# Patient Record
Sex: MALE | Race: WHITE
[De-identification: names, ages, dates, MRNs, and addresses within clinical notes are randomized per-mention and may not be internally consistent; named-entity substitution may affect disease eponyms.]

---

## 2019-09-12 ENCOUNTER — HOSPITAL ENCOUNTER (EMERGENCY)
Dept: HOSPITAL 1 - ED | Age: 24
Discharge: HOME | End: 2019-09-12
Payer: COMMERCIAL

## 2019-09-12 VITALS
WEIGHT: 220 LBS | BODY MASS INDEX: 32.58 KG/M2 | HEIGHT: 69 IN | HEIGHT: 69 IN | WEIGHT: 220 LBS | BODY MASS INDEX: 32.58 KG/M2

## 2019-09-12 VITALS — SYSTOLIC BLOOD PRESSURE: 115 MMHG | DIASTOLIC BLOOD PRESSURE: 58 MMHG

## 2019-09-12 DIAGNOSIS — S93.601A: Primary | ICD-10-CM
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
## 2019-09-12 DIAGNOSIS — F10.129: ICD-10-CM
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
## 2019-09-12 DIAGNOSIS — F32.9: ICD-10-CM

## 2019-10-07 ENCOUNTER — HOSPITAL ENCOUNTER (INPATIENT)
Dept: HOSPITAL 1 - ED | Age: 24
LOS: 3 days | Discharge: HOME | DRG: 563 | End: 2019-10-10
Attending: HOSPITALIST | Admitting: HOSPITALIST
Payer: COMMERCIAL

## 2019-10-07 VITALS
HEIGHT: 69 IN | HEIGHT: 69 IN | WEIGHT: 235 LBS | BODY MASS INDEX: 34.8 KG/M2 | WEIGHT: 235 LBS | BODY MASS INDEX: 34.8 KG/M2

## 2019-10-07 VITALS — DIASTOLIC BLOOD PRESSURE: 67 MMHG | SYSTOLIC BLOOD PRESSURE: 133 MMHG
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
## 2019-10-07 DIAGNOSIS — F10.129: ICD-10-CM

## 2019-10-07 DIAGNOSIS — S92.062A: Primary | ICD-10-CM

## 2019-10-07 DIAGNOSIS — S80.812A: ICD-10-CM

## 2019-10-07 DIAGNOSIS — S80.811A: ICD-10-CM

## 2019-10-07 DIAGNOSIS — D72.829: ICD-10-CM
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
## 2019-10-07 DIAGNOSIS — F32.9: ICD-10-CM
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
## 2019-10-07 DIAGNOSIS — S92.061A: ICD-10-CM

## 2019-10-07 DIAGNOSIS — Z72.0: ICD-10-CM

## 2019-10-07 DIAGNOSIS — E66.9: ICD-10-CM

## 2019-10-07 DIAGNOSIS — E78.5: ICD-10-CM

## 2019-10-07 LAB
ALBUMIN SERPL-MCNC: 4.5 G/DL (ref 3.4–5)
ALP SERPL-CCNC: 82 U/L (ref 46–116)
ALT SERPL-CCNC: 142 U/L (ref 16–63)
AMYLASE SERPL-CCNC: 48 U/L (ref 25–115)
AST SERPL-CCNC: 77 U/L (ref 15–37)
BASOPHILS NFR BLD: 0.2 % (ref 0–2)
BILIRUB SERPL-MCNC: 0.4 MG/DL (ref 0.2–1)
BUN SERPL-MCNC: 9 MG/DL (ref 7–18)
CALCIUM SERPL-MCNC: 9.3 MG/DL (ref 8.5–10.1)
CHLORIDE SERPL-SCNC: 106 MMOL/L (ref 98–107)
CHOLEST SERPL-MCNC: 231 MG/DL (ref ?–200)
CHOLEST/HDLC SERPL: 3 MG/DL
CO2 SERPL-SCNC: 29.3 MMOL/L (ref 21–32)
CREAT SERPL-MCNC: 0.9 MG/DL (ref 0.7–1.3)
ERYTHROCYTE [DISTWIDTH] IN BLOOD BY AUTOMATED COUNT: 15.2 % (ref 11.5–14.5)
GFR SERPLBLD BASED ON 1.73 SQ M-ARVRAT: > 60 ML/MIN
GLUCOSE SERPL-MCNC: 103 MG/DL (ref 74–106)
HDLC SERPL-MCNC: 77 MG/DL (ref 40–60)
LIPASE SERPL-CCNC: 153 IU/L (ref 73–393)
MAGNESIUM SERPL-MCNC: 2.4 MG/DL (ref 1.8–2.4)
PHOSPHATE SERPL-MCNC: 2.5 MG/DL (ref 2.5–4.9)
PLATELET # BLD: 275 X10^3MCL (ref 130–400)
POTASSIUM SERPL-SCNC: 4.7 MMOL/L (ref 3.5–5.1)
PROT SERPL-MCNC: 8.2 G/DL (ref 6.4–8.2)
SODIUM SERPL-SCNC: 144 MMOL/L (ref 136–145)
TRIGL SERPL-MCNC: 95 MG/DL (ref ?–150)

## 2019-10-07 PROCEDURE — G0480 DRUG TEST DEF 1-7 CLASSES: HCPCS

## 2019-10-07 PROCEDURE — G0378 HOSPITAL OBSERVATION PER HR: HCPCS

## 2019-10-07 NOTE — NUR
RECEIVED PT FROM ER [facility]IA [facility] WITH FAMILY AT BEDSIDE. PT IS AWAKE, ALERT,
ORIENTED X4. SPEECH CLEAR. ABLE TO MAKE NEEDS KNOWN. PT HAS IV TO RAC
INFUSING NS.  BLE APPEAR SWOLLEN, PER REPORT PT HAS BILATERAL HEEL FRACTURE
D/T FALL AT WORK.  BLE ELEVATED WITH PILLOW. PULSES PALPABLE. MARIO RN TO
ADMIT PT AT THIS TIME. CALL LIGHT GIVEN TO PT, INSTRUCTED PT TO USE CALL LIGHT
FOR ASSISTANCE. BED IS IN LOWEST POSITION. WILL CARRY OUT ALL NEW ORDERS AT
THIS TIME.

## 2019-10-07 NOTE — NUR
PT CONTINUES TO BE IN PAIN, TORADOL GIVEN AS ORDERED. ICE PACKS PROVIDE TO
BILATERAL KNEES. BLE ELEVATED WITH TWO PILLOWS. IVF ONGOING. WILL CONTINUE TO
MONITOR.

## 2019-10-07 NOTE — NUR
RECEIVED PT FROM ER, PT ADMIT FOR FRANK CALCAEAS. PT IS A/O X4, VERBAL
RESPONSIVE, LUNG SOUND CLEAR BILATERAL, NO COUGH, NO SOB. PT IS ON TELE 14,
NSR, DENY ANY CHEST PAIN OR DISCOMFORT, BOWEL SOUND PRESENT ALL 4 QUADRANTS,
NO DISTENTION, NO TENDER. PEDAL PULSE PRESENT BOTH FEET, NON PITTING SWELLING.
PODARTIRST WRAPPING FRANK LEG AT THE MOMENT. PT C/O MILD NUMBNESS AT TOES, IV AT
RIGHT AC, NO LEAKING, NO INFILTRAITON. ALL ADLS ASSIST, ALL NEED MET, CALL
LIGHT IN REACH, WILL CONTINUE TO MONITOR.

## 2019-10-08 VITALS — SYSTOLIC BLOOD PRESSURE: 134 MMHG | DIASTOLIC BLOOD PRESSURE: 81 MMHG

## 2019-10-08 VITALS — SYSTOLIC BLOOD PRESSURE: 145 MMHG | DIASTOLIC BLOOD PRESSURE: 88 MMHG

## 2019-10-08 VITALS — DIASTOLIC BLOOD PRESSURE: 74 MMHG | SYSTOLIC BLOOD PRESSURE: 136 MMHG

## 2019-10-08 VITALS — DIASTOLIC BLOOD PRESSURE: 89 MMHG | SYSTOLIC BLOOD PRESSURE: 144 MMHG

## 2019-10-08 LAB
BASOPHILS NFR BLD: 0.4 % (ref 0–2)
BUN SERPL-MCNC: 11 MG/DL (ref 7–18)
CALCIUM SERPL-MCNC: 8.1 MG/DL (ref 8.5–10.1)
CHLORIDE SERPL-SCNC: 103 MMOL/L (ref 98–107)
CO2 SERPL-SCNC: 25.9 MMOL/L (ref 21–32)
CREAT SERPL-MCNC: 0.9 MG/DL (ref 0.7–1.3)
ERYTHROCYTE [DISTWIDTH] IN BLOOD BY AUTOMATED COUNT: 15 % (ref 11.5–14.5)
GFR SERPLBLD BASED ON 1.73 SQ M-ARVRAT: > 60 ML/MIN
GLUCOSE SERPL-MCNC: 107 MG/DL (ref 74–106)
MAGNESIUM SERPL-MCNC: 1.7 MG/DL (ref 1.8–2.4)
PHOSPHATE SERPL-MCNC: 4.2 MG/DL (ref 2.5–4.9)
PLATELET # BLD: 202 X10^3MCL (ref 130–400)
POTASSIUM SERPL-SCNC: 4 MMOL/L (ref 3.5–5.1)
SODIUM SERPL-SCNC: 138 MMOL/L (ref 136–145)

## 2019-10-08 NOTE — NUR
AAO TIMES 4. MED SURG PATIENT. NO C/O PAIN. NO SOB. BLE ELEVATED ON PILLOWS
AND ICE PACKS ARE BEHIND HIS KNEES AS ORDERED. HIS FRIENDS AND FAMILY ARE
VISITING NOW, HE IS HAPPY.

## 2019-10-08 NOTE — NUR
TORADOL GIVEN AS ORDERED. PT RESTING IN BED. BLE ELEVATED WITH PILLOWS. CALL
LIGHT WITHIN REACH. BED IS IN LOWEST POSITION. WILL ENDORSE TO INCOMING SHIFT.

## 2019-10-08 NOTE — NUR
RECEIVED AWAKE IN BED WATCHING TV. SKIN WARM AND DRY TO TOUCH WITH DRESSING
INTACT TO BLE, , ABLE TO WIGGLE TOES, NO IMPAIRED CIRCULATION NOTED.
RESPIRATION EVEN AND UNLABORED. BLE ELEVATED WITH PILLOWS. FAMI;Y AT BEDSIDE
VERY SUPPORTIVE OF PATIENT'S CURRENT PLAN OF CARE.

## 2019-10-08 NOTE — NUR
PT C/O PAIN TO BLE, MEDICATED WITH DILAUDID AS ORDERED. CALL LIGHT WITHIN
REACH. WILL CONTINUE TO MONITOR CLOSELY.

## 2019-10-09 VITALS — SYSTOLIC BLOOD PRESSURE: 136 MMHG | DIASTOLIC BLOOD PRESSURE: 84 MMHG

## 2019-10-09 VITALS — DIASTOLIC BLOOD PRESSURE: 93 MMHG | SYSTOLIC BLOOD PRESSURE: 140 MMHG

## 2019-10-09 VITALS — SYSTOLIC BLOOD PRESSURE: 141 MMHG | DIASTOLIC BLOOD PRESSURE: 86 MMHG

## 2019-10-09 VITALS — DIASTOLIC BLOOD PRESSURE: 75 MMHG | SYSTOLIC BLOOD PRESSURE: 131 MMHG

## 2019-10-09 NOTE — NUR
RECEIVED PT FROM NIGHT SHIFT NURSE. PT RESTING IN BED, AOX4, RESP E/U ON RA.
DENIES NAUSEA, SOB OR PAIN AT THIS TIME, NO ACUTE DISTRESS NOTED. IV TO RAC W/
NO SIGNS OF INFILTRATION, IVF INFUSING WELL. ACE WRAP CDI TO BLE. BED IN
LOWEST POSITION AND CALL LIGHT WITHIN REACH. WILL CONTINUE TO MONITOR.

## 2019-10-09 NOTE — NUR
EYES CLOSED, PATIENT APPARENTLY ASLEEP SOUNDLY, RESPIRATION EVEN AND
UNLABORED. NO S/S OF APIN/DISCOMFORT.

## 2019-10-09 NOTE — NUR
DUE MEDICATIONS GIVEN AS ORDERED, TOLERATED WELL, ABLE TO REPOSITION SELF IN
BED. IVF NS CONTINOUSLY INFUSING AT 100CC/HR VIA PERIPHERAL LINE AT THE [facility] .
BED IN LOWEST POSITION FOR SAFETY.

## 2019-10-09 NOTE — NUR
PT IN BED HAVING DINNER, AOX4, RESP E/U ON RA. REPORTED MILD PAIN TO BILAT
HEELS, TOLERABLE AT THIS TIME. BLE ELEVATED ON PILLOWS, COMPRESSION SPLINTS
CDI, ICE APPLIED UNDER BOTH KNEES. IV TO RAC W/ NO SIGNS OF INFILTRATION, IVF
INFUSING WELL. BED IN LOWEST POSITION AND CALL LIGHT WITHIN REACH. WILL
ENDORSE TO ONCOMING NURSE.

## 2019-10-09 NOTE — NUR
RECEIVED PT FROM PREVIOUS SHIFT. PT A/OX4. DENIES PAIN. DENIES SOB ON RA. IV
PATENT AND INFUSING NS AT 100ML/HR TO RAC WITH NO S/S OF INFILTRATION.
BLE WITH COMPRESSION DRESSING AND ELEVATED. CAP REFIL <3 SECONDS. PT ABLE TO
WIGGLE TOES. CALL LIGHT WITHIN REACH, BED IN LOW POSITION. WILL CONTINUE TO
MONITOR.

## 2019-10-09 NOTE — NUR
PT RESTING IN BED, AOX4, RESP E/U ON RA. REPORTED MOD PAIN RATED 5/10.
MEDICATED AS ORDERED PER EMAR. BLE ELAVATED ON PILLOWS, DRESSING CDI, ICE
PACKS PLACED UNDER KNEES. BED IN LOWEST POSITION AND CALL LIGHT WITHIN REACH.
WILL CONTINUE TO MONITOR.

## 2019-10-10 VITALS — SYSTOLIC BLOOD PRESSURE: 136 MMHG | DIASTOLIC BLOOD PRESSURE: 88 MMHG

## 2019-10-10 VITALS — DIASTOLIC BLOOD PRESSURE: 93 MMHG | SYSTOLIC BLOOD PRESSURE: 140 MMHG

## 2019-10-10 VITALS — DIASTOLIC BLOOD PRESSURE: 69 MMHG | SYSTOLIC BLOOD PRESSURE: 139 MMHG

## 2019-10-10 NOTE — NUR
RECEIVED PT FROM NIGHT SHIFT NURSE. PT RESTING IN BED, AOX4, RESP E/U ON RA.
PT DENIES PAIN AT THIS TIME, NO ACUTE DISTRESS NOTED. IV TO RAC W/ NO SIGNS OF
INFILTRATION, IVF INFUSGIN WELL. COMPRESSION SPLINTS TO BLE IN PLACE CDI,
ELEVATED ON PILLOWS. BED IN LOWEST POSTION AND CALL LIGHT WITHIN REACH. WILL
CONTINUE TO MONITOR.

## 2019-10-10 NOTE — NUR
DISCHARGE DONE. REVIEWED VISIT SUMMARY, EDUCATIONAL PACKET, FOLLOW UP
INSTRUCTIONS AND NEW RX MEDS W/ PT. PT AOX4, RESP E/U ON RA, VS STABLE, DENIES
PAIN AT THIS TIME. IV TO RAC REMOVED, CATH INACT, GAUZE DRESSING APPLIED. PT
BROUGHT DOWN TO LOBBY VIA WHEELCHAIR, ESCORTED BY JUSTO RODRIGUEZ W/ NO
ACUTE INCIDENCE.

## 2022-02-13 NOTE — NUR
PT TO ED FOR EVAL OF BILAT ANKLE AND FEET PAIN. PT WAS ON A PALLET THAT WAS
BEING RASIED BY A FORK LIFT.  HE FELL OFF FORKLIFT, APPROX 20 FEET AND LANDED
ON HIS FEET THEN FALLING ONTO BUTTOCKS. DENIES NECK OR BACK PAIN.  + PAIN AND
SWELLING TO BILA FEET. NO DEFORMITY.   PT AWAKE AND ALERT. BREATHING EVEN
UNLABORED.  NO DISTRESS. Unable to assess due to patient's cognitive impairment